# Patient Record
Sex: MALE | Race: WHITE | NOT HISPANIC OR LATINO | Employment: FULL TIME | RURAL
[De-identification: names, ages, dates, MRNs, and addresses within clinical notes are randomized per-mention and may not be internally consistent; named-entity substitution may affect disease eponyms.]

---

## 2019-11-20 ENCOUNTER — TELEPHONE (OUTPATIENT)
Dept: FAMILY MEDICINE CLINIC | Facility: CLINIC | Age: 34
End: 2019-11-20

## 2019-11-20 NOTE — TELEPHONE ENCOUNTER
Patient stopped by office yesterday stating that he gotten up during the night to pee and passed out and has bruised ribs from the fall. He states that he is aware that there is nothing to do for the bruised ribs but was questioning why he passed out. He wanted to know if it was from getting up to quickly.   I have tried three times to call the patient at the number he left ([phone]) to suggest that he schedule an appointment to be seen for evaluation and he does not answer and there is no voicemail set up.

## 2019-11-21 NOTE — TELEPHONE ENCOUNTER
So noted.  If we can page it of communicate to the staff to watch for him and schedule him that is about all we can do.

## 2019-11-25 PROBLEM — F41.9 ANXIETY: Status: ACTIVE | Noted: 2019-02-05

## 2019-11-25 PROBLEM — F32.A DEPRESSION: Status: ACTIVE | Noted: 2019-02-05

## 2019-11-25 PROBLEM — R07.9 CHEST PAIN: Status: ACTIVE | Noted: 2019-02-14

## 2019-11-25 PROBLEM — R55 SYNCOPE: Status: ACTIVE | Noted: 2019-11-25

## 2019-11-25 RX ORDER — BUSPIRONE HYDROCHLORIDE 10 MG/1
TABLET ORAL EVERY 12 HOURS
COMMUNITY
Start: 2019-02-05 | End: 2020-03-24

## 2019-11-26 ENCOUNTER — OFFICE VISIT (OUTPATIENT)
Dept: FAMILY MEDICINE CLINIC | Facility: CLINIC | Age: 34
End: 2019-11-26

## 2019-11-26 VITALS
SYSTOLIC BLOOD PRESSURE: 145 MMHG | TEMPERATURE: 98 F | RESPIRATION RATE: 16 BRPM | BODY MASS INDEX: 29.26 KG/M2 | HEIGHT: 72 IN | OXYGEN SATURATION: 98 % | HEART RATE: 73 BPM | DIASTOLIC BLOOD PRESSURE: 83 MMHG | WEIGHT: 216 LBS

## 2019-11-26 DIAGNOSIS — R53.83 OTHER FATIGUE: ICD-10-CM

## 2019-11-26 DIAGNOSIS — R00.2 PALPITATIONS: Primary | ICD-10-CM

## 2019-11-26 PROCEDURE — 99214 OFFICE O/P EST MOD 30 MIN: CPT | Performed by: NURSE PRACTITIONER

## 2019-11-26 PROCEDURE — 93000 ELECTROCARDIOGRAM COMPLETE: CPT | Performed by: NURSE PRACTITIONER

## 2019-11-26 NOTE — PATIENT INSTRUCTIONS
Discussed differential diagnosis with patient.  Will get Holter monitor as well as TSH.  May consider testosterone level if those are normal.  Also may do cardiac referral.

## 2019-11-26 NOTE — PROGRESS NOTES
Chief Complaint   Patient presents with   • Dizziness     Seen at [facility] ER on 11/24/2019        Subjective   Mykel Fernandez is a 34 y.o. male who presents today for fatigue and palpitations.    HPI: About 3 weeks ago patient stood up from the couch after being asleep for a while and got somewhat dizzy.  He went to the bathroom and and passed out.  He injured his ribs at the time.  He felt okay and felt like it was because he got up quickly.  He had another episode of feeling palpitations and tingling in his left arm.  He was seen at [facility] on the 24th.  He had a CT scan of his head chest x-ray and labs.  Which were all normal.  Patient is here to foot today to follow-up on that ER visit.  He reports that he has had different episodes of fatigue and occasional palpitations a couple of times a month over the past few months.  He does not feel like he has a normal type energy level he usually has.  The only medication he takes is BuSpar he is taking it on a regular basis.  He has no increase in anxiety or depression.  He is here for additional evaluation in regard to this fatigue and palpitations.  Brother has a history of Fritz-Parkinson-White.  Patient has never had any symptoms like this in the past.    Mykel Fernandez  has a past medical history of Anxiety, Chest pain, and Depression.    No Known Allergies    Current Outpatient Medications:   •  busPIRone (BUSPAR) 10 MG tablet, Every 12 (Twelve) Hours., Disp: , Rfl:   Past Medical History:   Diagnosis Date   • Anxiety    • Chest pain    • Depression      History reviewed. No pertinent surgical history.  Social History     Socioeconomic History   • Marital status:      Spouse name: Not on file   • Number of children: Not on file   • Years of education: Not on file   • Highest education level: Not on file   Tobacco Use   • Smoking status: Never Smoker   • Smokeless tobacco: Never Used   Substance and Sexual Activity   • Alcohol use: Yes      "Frequency: Never   • Drug use: No     Family History   Problem Relation Age of Onset   • Hypertension Brother        Family history, surgical history, past medical history, Allergies and med's reviewed with patient today and updated in EP[employer] EMR.   PHQ-2 Depression Screening  Little interest or pleasure in doing things? 0   Feeling down, depressed, or hopeless? 0   PHQ-2 Total Score 0   PHQ-9 Depression Screening  Little interest or pleasure in doing things? 0   Feeling down, depressed, or hopeless? 0   Trouble falling or staying asleep, or sleeping too much?     Feeling tired or having little energy?     Poor appetite or overeating?     Feeling bad about yourself - or that you are a failure or have let yourself or your family down?     Trouble concentrating on things, such as reading the newspaper or watching television?     Moving or speaking so slowly that other people could have noticed? Or the opposite - being so fidgety or restless that you have been moving around a lot more than usual?     Thoughts that you would be better off dead, or of hurting yourself in some way?     PHQ-9 Total Score 0   If you checked off any problems, how difficult have these problems made it for you to do your work, take care of things at home, or get along with other people?       ROS:  Review of Systems   Constitutional: Positive for fatigue. Negative for fever.   Respiratory: Negative for cough and shortness of breath.    Cardiovascular: Positive for palpitations. Negative for chest pain.   Gastrointestinal: Negative for abdominal pain.   Genitourinary: Negative for dysuria.       OBJECTIVE:  Vitals:    11/26/19 1459   BP: 145/83   BP Location: Right arm   Patient Position: Sitting   Cuff Size: Adult   Pulse: 73   Resp: 16   Temp: 98 °F (36.7 °C)   TempSrc: Oral   SpO2: 98%   Weight: 98 kg (216 lb)   Height: 182.9 cm (72\")     Physical Exam   Constitutional: He appears well-developed and well-nourished.   Cardiovascular: Normal " rate, regular rhythm and normal heart sounds. Exam reveals no gallop and no friction rub.   No murmur heard.  Pulmonary/Chest: Effort normal and breath sounds normal. He has no wheezes. He has no rales.   Abdominal: Soft. Bowel sounds are normal. There is no tenderness.   Neurological: He is alert.   Skin: Skin is warm and dry.   Nursing note and vitals reviewed.      ASSESSMENT/ PLAN:    Mykel was seen today for dizziness.    Diagnoses and all orders for this visit:    Palpitations  -     TSH  -     ECG 12 Lead  -     Holter Monitor - 24 Hour; Future    Other fatigue  -     TSH  -     ECG 12 Lead  -     Holter Monitor - 24 Hour; Future        Orders Placed Today:     No orders of the defined types were placed in this encounter.       Management Plan:     An After Visit Summary was printed and given to the patient at discharge.    Follow-up: Return if symptoms worsen or fail to improve.    ANNEL Jacinto 11/26/2019 4:24 PM  This note was electronically signed.

## 2019-11-27 LAB — TSH SERPL DL<=0.005 MIU/L-ACNC: 1.06 UIU/ML (ref 0.45–4.5)

## 2019-12-03 ENCOUNTER — TELEPHONE (OUTPATIENT)
Dept: FAMILY MEDICINE CLINIC | Facility: CLINIC | Age: 34
End: 2019-12-03

## 2019-12-03 DIAGNOSIS — R53.83 OTHER FATIGUE: ICD-10-CM

## 2019-12-03 DIAGNOSIS — R00.2 PALPITATIONS: ICD-10-CM

## 2019-12-03 DIAGNOSIS — R55 SYNCOPE, UNSPECIFIED SYNCOPE TYPE: Primary | ICD-10-CM

## 2019-12-03 NOTE — TELEPHONE ENCOUNTER
Please inform patient that the Holter monitor appeared to be basically normal.  There was occasionally what would be considered normal fast beats.  Due to his palpitations fatigue and syncopal episode, will go ahead and refer him to cardiology.  As discussed previously they may want to do a 30-day monitor.

## 2019-12-27 ENCOUNTER — OFFICE VISIT (OUTPATIENT)
Dept: CARDIOLOGY | Facility: CLINIC | Age: 34
End: 2019-12-27

## 2019-12-27 VITALS
BODY MASS INDEX: 29.53 KG/M2 | HEIGHT: 72 IN | SYSTOLIC BLOOD PRESSURE: 151 MMHG | DIASTOLIC BLOOD PRESSURE: 88 MMHG | HEART RATE: 69 BPM | WEIGHT: 218 LBS

## 2019-12-27 DIAGNOSIS — R00.2 PALPITATIONS: ICD-10-CM

## 2019-12-27 DIAGNOSIS — R55 SYNCOPE, UNSPECIFIED SYNCOPE TYPE: Primary | ICD-10-CM

## 2019-12-27 DIAGNOSIS — R55 NEAR SYNCOPE: ICD-10-CM

## 2019-12-27 DIAGNOSIS — R07.9 CHEST PAIN, UNSPECIFIED TYPE: ICD-10-CM

## 2019-12-27 PROCEDURE — 99204 OFFICE O/P NEW MOD 45 MIN: CPT | Performed by: INTERNAL MEDICINE

## 2019-12-27 NOTE — PROGRESS NOTES
Date of Office Visit: 2019  Encounter Provider: Dr. Salinas Valdivia  Place of Service: [facility] CARDIOLOGY [place]  Patient Name: Mykel Fernandez  :1985  Zohra Bradley APRN    Chief Complaint   Patient presents with   • Palpitations     consult/holter   • Syncope   • Chest Pain     History of Present Illness:    I am pleased to see Mr. Fernandez in my office today as a new consultation.    As you know, patient is 34 years old white gentleman whose past medical history is significant for anxiety disorder who is referred to me for near syncopal episode and palpitation.    In 2019, patient had an episode of syncope.  Patient reports that he was in the bed in the middle of night and woke up for urination.  He walked to the restroom.  He was micturating while standing and he passed out and hit his rib cage to the toilet bowl.  Patient was evaluated by PCP.  Patient underwent Holter monitor which was unremarkable.  Patient reported symptom of palpitation at the time of his syncope.  Patient did complain of mild chest pain.  Patient had similar episode at age 13 many years ago when he passed out while micturition.  Initial work-up at that time was unremarkable as per patient.    Patient denies any symptom of chest pain or tightness or heaviness in between these episode.  Patient denies orthopnea, PND, fever or chills.  No leg edema noted.    EKG done in your office showed normal sinus rhythm with short UT interval.  No delta waves are noted.    Patient does not have previous history of CAD, PCI or MI.  He drinks 3-4 beers 2-3 times in a week.  No recreational drug.  Patient has decrease his caffeine intake after this episode.  Patient has history of WPW syndrome for his younger brother.  Patient brother required ablation.    His EKG does not suggest WPW but UT interval is short.  Likelihood of accessory pathway is there.  At this stage I will recommend to proceed with event monitor,  "echocardiogram and TMT.  If these initial tests are negative I would proceed with loop recorder implantation.  His blood pressure is slightly elevated at this time I would recommend blood pressure monitoring.        Past Medical History:   Diagnosis Date   • Anxiety    • Chest pain    • Depression    • Palpitations          Past Surgical History:   Procedure Laterality Date   • CONVERTED (HISTORICAL) HOLTER  2019           Current Outpatient Medications:   •  busPIRone (BUSPAR) 10 MG tablet, Every 12 (Twelve) Hours., Disp: , Rfl:       Social History     Socioeconomic History   • Marital status:      Spouse name: Not on file   • Number of children: Not on file   • Years of education: Not on file   • Highest education level: Not on file   Tobacco Use   • Smoking status: Never Smoker   • Smokeless tobacco: Never Used   Substance and Sexual Activity   • Alcohol use: Yes     Frequency: Never   • Drug use: No   • Sexual activity: Defer         Review of Systems   Constitution: Negative for chills and fever.   HENT: Negative for ear discharge and nosebleeds.    Eyes: Negative for discharge and redness.   Cardiovascular: Positive for chest pain, palpitations and syncope. Negative for orthopnea and paroxysmal nocturnal dyspnea.   Respiratory: Negative for cough, shortness of breath and wheezing.    Endocrine: Negative for heat intolerance.   Skin: Negative for rash.   Musculoskeletal: Negative for arthritis and myalgias.   Gastrointestinal: Negative for abdominal pain, melena, nausea and vomiting.   Genitourinary: Negative for dysuria and hematuria.   Neurological: Negative for dizziness, light-headedness, numbness and tremors.   Psychiatric/Behavioral: Negative for depression. The patient is not nervous/anxious.        Procedures    Procedures    No orders to display           Objective:    /88   Pulse 69   Ht 182.9 cm (72\")   Wt 98.9 kg (218 lb)   BMI 29.57 kg/m²         Physical Exam   Constitutional: " He is oriented to person, place, and time. He appears well-developed and well-nourished.   HENT:   Head: Normocephalic and atraumatic.   Eyes: No scleral icterus.   Neck: No thyromegaly present.   Cardiovascular: Normal rate, regular rhythm and normal heart sounds. Exam reveals no gallop and no friction rub.   No murmur heard.  Pulmonary/Chest: Effort normal and breath sounds normal. No respiratory distress. He has no wheezes. He has no rales.   Abdominal: There is no tenderness.   Musculoskeletal: He exhibits no edema.   Lymphadenopathy:     He has no cervical adenopathy.   Neurological: He is alert and oriented to person, place, and time.   Skin: No rash noted. No erythema.   Psychiatric: He has a normal mood and affect.           Assessment:       Diagnosis Plan   1. Syncope, unspecified syncope type  Cardiac Event Monitor    Treadmill Stress Test    Adult Transthoracic Echo Complete W/ Cont if Necessary Per Protocol   2. Palpitations  Cardiac Event Monitor    Treadmill Stress Test    Adult Transthoracic Echo Complete W/ Cont if Necessary Per Protocol   3. Chest pain, unspecified type  Cardiac Event Monitor    Treadmill Stress Test    Adult Transthoracic Echo Complete W/ Cont if Necessary Per Protocol   4. Near syncope  Cardiac Event Monitor    Treadmill Stress Test    Adult Transthoracic Echo Complete W/ Cont if Necessary Per Protocol            Plan:       I would recommend to proceed with event monitor and TMT.  Echocardiogram would be done.  I will see the patient in 4 weeks

## 2020-01-10 ENCOUNTER — HOSPITAL ENCOUNTER (OUTPATIENT)
Dept: RESPIRATORY THERAPY | Facility: HOSPITAL | Age: 35
Discharge: HOME | End: 2020-01-10

## 2020-01-10 ENCOUNTER — HOSPITAL ENCOUNTER (OUTPATIENT)
Dept: CARDIOLOGY | Facility: HOSPITAL | Age: 35
Discharge: HOME | End: 2020-01-10
Admitting: INTERNAL MEDICINE

## 2020-01-10 VITALS
HEIGHT: 72 IN | SYSTOLIC BLOOD PRESSURE: 128 MMHG | DIASTOLIC BLOOD PRESSURE: 90 MMHG | BODY MASS INDEX: 29.53 KG/M2 | WEIGHT: 218 LBS

## 2020-01-10 DIAGNOSIS — R07.9 CHEST PAIN, UNSPECIFIED TYPE: ICD-10-CM

## 2020-01-10 DIAGNOSIS — R55 SYNCOPE, UNSPECIFIED SYNCOPE TYPE: ICD-10-CM

## 2020-01-10 DIAGNOSIS — R55 NEAR SYNCOPE: ICD-10-CM

## 2020-01-10 DIAGNOSIS — R00.2 PALPITATIONS: ICD-10-CM

## 2020-01-10 PROCEDURE — 93270 REMOTE 30 DAY ECG REV/REPORT: CPT

## 2020-01-10 PROCEDURE — 93306 TTE W/DOPPLER COMPLETE: CPT

## 2020-01-12 LAB
BH CV ECHO MEAS - % IVS THICK: 48.6 %
BH CV ECHO MEAS - % LVPW THICK: 46.5 %
BH CV ECHO MEAS - ACS: 2 CM
BH CV ECHO MEAS - AO MAX PG (FULL): 1.2 MMHG
BH CV ECHO MEAS - AO MAX PG: 6.4 MMHG
BH CV ECHO MEAS - AO MEAN PG (FULL): 0.8 MMHG
BH CV ECHO MEAS - AO MEAN PG: 3.5 MMHG
BH CV ECHO MEAS - AO ROOT AREA (BSA CORRECTED): 1.3
BH CV ECHO MEAS - AO ROOT AREA: 6.5 CM^2
BH CV ECHO MEAS - AO ROOT DIAM: 2.9 CM
BH CV ECHO MEAS - AO V2 MAX: 126.1 CM/SEC
BH CV ECHO MEAS - AO V2 MEAN: 88.6 CM/SEC
BH CV ECHO MEAS - AO V2 VTI: 27.6 CM
BH CV ECHO MEAS - AVA(I,A): 3.1 CM^2
BH CV ECHO MEAS - AVA(I,D): 3.1 CM^2
BH CV ECHO MEAS - AVA(V,A): 3.1 CM^2
BH CV ECHO MEAS - AVA(V,D): 3.1 CM^2
BH CV ECHO MEAS - BSA(HAYCOCK): 2.3 M^2
BH CV ECHO MEAS - BSA: 2.2 M^2
BH CV ECHO MEAS - BZI_BMI: 29.6 KILOGRAMS/M^2
BH CV ECHO MEAS - BZI_METRIC_HEIGHT: 182.9 CM
BH CV ECHO MEAS - BZI_METRIC_WEIGHT: 98.9 KG
BH CV ECHO MEAS - EDV(CUBED): 159.5 ML
BH CV ECHO MEAS - EDV(MOD-SP2): 91.9 ML
BH CV ECHO MEAS - EDV(MOD-SP4): 76.8 ML
BH CV ECHO MEAS - EDV(TEICH): 142.7 ML
BH CV ECHO MEAS - EF(CUBED): 80.2 %
BH CV ECHO MEAS - EF(MOD-BP): 56 %
BH CV ECHO MEAS - EF(MOD-SP2): 66.3 %
BH CV ECHO MEAS - EF(MOD-SP4): 36.3 %
BH CV ECHO MEAS - EF(TEICH): 72.1 %
BH CV ECHO MEAS - ESV(CUBED): 31.6 ML
BH CV ECHO MEAS - ESV(MOD-SP2): 31 ML
BH CV ECHO MEAS - ESV(MOD-SP4): 48.9 ML
BH CV ECHO MEAS - ESV(TEICH): 39.8 ML
BH CV ECHO MEAS - FS: 41.7 %
BH CV ECHO MEAS - IVS/LVPW: 0.91
BH CV ECHO MEAS - IVSD: 0.97 CM
BH CV ECHO MEAS - IVSS: 1.4 CM
BH CV ECHO MEAS - LA DIMENSION(2D): 3.3 CM
BH CV ECHO MEAS - LV DIASTOLIC VOL/BSA (35-75): 34.7 ML/M^2
BH CV ECHO MEAS - LV MASS(C)D: 213 GRAMS
BH CV ECHO MEAS - LV MASS(C)DI: 96.4 GRAMS/M^2
BH CV ECHO MEAS - LV MASS(C)S: 168.9 GRAMS
BH CV ECHO MEAS - LV MASS(C)SI: 76.4 GRAMS/M^2
BH CV ECHO MEAS - LV MAX PG: 5.2 MMHG
BH CV ECHO MEAS - LV MEAN PG: 2.7 MMHG
BH CV ECHO MEAS - LV SYSTOLIC VOL/BSA (12-30): 22.1 ML/M^2
BH CV ECHO MEAS - LV V1 MAX: 113.7 CM/SEC
BH CV ECHO MEAS - LV V1 MEAN: 76.7 CM/SEC
BH CV ECHO MEAS - LV V1 VTI: 24.4 CM
BH CV ECHO MEAS - LVIDD: 5.4 CM
BH CV ECHO MEAS - LVIDS: 3.2 CM
BH CV ECHO MEAS - LVOT AREA: 3.4 CM^2
BH CV ECHO MEAS - LVOT DIAM: 2.1 CM
BH CV ECHO MEAS - LVPWD: 1.1 CM
BH CV ECHO MEAS - LVPWS: 1.6 CM
BH CV ECHO MEAS - MV A MAX VEL: 60.5 CM/SEC
BH CV ECHO MEAS - MV DEC SLOPE: 478.9 CM/SEC^2
BH CV ECHO MEAS - MV DEC TIME: 0.19 SEC
BH CV ECHO MEAS - MV E MAX VEL: 89.6 CM/SEC
BH CV ECHO MEAS - MV E/A: 1.5
BH CV ECHO MEAS - MV MAX PG: 3.9 MMHG
BH CV ECHO MEAS - MV MEAN PG: 0.96 MMHG
BH CV ECHO MEAS - MV V2 MAX: 98.6 CM/SEC
BH CV ECHO MEAS - MV V2 MEAN: 42.6 CM/SEC
BH CV ECHO MEAS - MV V2 VTI: 31.5 CM
BH CV ECHO MEAS - MVA(VTI): 2.7 CM^2
BH CV ECHO MEAS - PA ACC TIME: 0.11 SEC
BH CV ECHO MEAS - PA MAX PG (FULL): 0.68 MMHG
BH CV ECHO MEAS - PA MAX PG: 2.4 MMHG
BH CV ECHO MEAS - PA MEAN PG (FULL): 0.58 MMHG
BH CV ECHO MEAS - PA MEAN PG: 1.6 MMHG
BH CV ECHO MEAS - PA PR(ACCEL): 29.2 MMHG
BH CV ECHO MEAS - PA V2 MAX: 77.9 CM/SEC
BH CV ECHO MEAS - PA V2 MEAN: 60.6 CM/SEC
BH CV ECHO MEAS - PA V2 VTI: 16.8 CM
BH CV ECHO MEAS - PULM A REVS DUR: 0.07 SEC
BH CV ECHO MEAS - PULM A REVS VEL: 27.6 CM/SEC
BH CV ECHO MEAS - PULM DIAS VEL: 34.9 CM/SEC
BH CV ECHO MEAS - PULM S/D: 1.2
BH CV ECHO MEAS - PULM SYS VEL: 42.7 CM/SEC
BH CV ECHO MEAS - RAP SYSTOLE: 3 MMHG
BH CV ECHO MEAS - RV MAX PG: 1.8 MMHG
BH CV ECHO MEAS - RV MEAN PG: 0.98 MMHG
BH CV ECHO MEAS - RV V1 MAX: 66.2 CM/SEC
BH CV ECHO MEAS - RV V1 MEAN: 46.2 CM/SEC
BH CV ECHO MEAS - RV V1 VTI: 14.9 CM
BH CV ECHO MEAS - RVDD: 2.7 CM
BH CV ECHO MEAS - RVSP: 18.9 MMHG
BH CV ECHO MEAS - SI(AO): 81 ML/M^2
BH CV ECHO MEAS - SI(CUBED): 57.9 ML/M^2
BH CV ECHO MEAS - SI(LVOT): 38.1 ML/M^2
BH CV ECHO MEAS - SI(MOD-SP2): 27.6 ML/M^2
BH CV ECHO MEAS - SI(MOD-SP4): 12.6 ML/M^2
BH CV ECHO MEAS - SI(TEICH): 46.6 ML/M^2
BH CV ECHO MEAS - SV(AO): 179.1 ML
BH CV ECHO MEAS - SV(CUBED): 128 ML
BH CV ECHO MEAS - SV(LVOT): 84.2 ML
BH CV ECHO MEAS - SV(MOD-SP2): 60.9 ML
BH CV ECHO MEAS - SV(MOD-SP4): 27.9 ML
BH CV ECHO MEAS - SV(TEICH): 103 ML
BH CV ECHO MEAS - TR MAX VEL: 176.1 CM/SEC

## 2020-01-12 PROCEDURE — 93306 TTE W/DOPPLER COMPLETE: CPT | Performed by: INTERNAL MEDICINE

## 2020-01-14 ENCOUNTER — TELEPHONE (OUTPATIENT)
Dept: CARDIOLOGY | Facility: CLINIC | Age: 35
End: 2020-01-14

## 2020-02-07 ENCOUNTER — OFFICE VISIT (OUTPATIENT)
Dept: CARDIOLOGY | Facility: CLINIC | Age: 35
End: 2020-02-07

## 2020-02-07 VITALS
HEIGHT: 72 IN | DIASTOLIC BLOOD PRESSURE: 78 MMHG | BODY MASS INDEX: 28.44 KG/M2 | HEART RATE: 85 BPM | WEIGHT: 210 LBS | SYSTOLIC BLOOD PRESSURE: 119 MMHG

## 2020-02-07 DIAGNOSIS — R00.2 PALPITATIONS: Primary | ICD-10-CM

## 2020-02-07 DIAGNOSIS — R55 NEAR SYNCOPE: ICD-10-CM

## 2020-02-07 DIAGNOSIS — R07.9 CHEST PAIN, UNSPECIFIED TYPE: ICD-10-CM

## 2020-02-07 PROCEDURE — 99213 OFFICE O/P EST LOW 20 MIN: CPT | Performed by: INTERNAL MEDICINE

## 2020-02-07 NOTE — PROGRESS NOTES
Date of Office Visit: 2020  Encounter Provider: Dr. Salinas Valdivia  Place of Service: [facility] CARDIOLOGY [place]  Patient Name: Mykel Fernandez  :1985  Zohra Bradley APRN    Chief Complaint   Patient presents with   • Palpitations     6 week follow up echo/stress test/holter   • Chest Pain   • Syncope     History of Present Illness    I am pleased to see Mr. Fernandez in my office today as a follow-up    As you know, patient is 34 years old white gentleman whose past medical history is significant for anxiety disorder who came today for follow-up.    In 2019, patient was presented to me for near syncopal episode.  Patient had syncope at the time of micturition.  Patient had similar episode about 12 years ago.  Patient underwent Holter monitor which was unremarkable.  Patient underwent TMT and it was negative for ischemia or arrhythmia.  Echocardiogram showed preserved left ventricle function.  Event monitor is in progress but so far there is no significant arrhythmia was noted.    Since the previous visit patient has done well.  Patient has not had any further episode of dizziness or lightheadedness or syncope or presyncope.  No chest pain or tightness or heaviness no orthopnea or PND.    At this stage, I would recommend that patient should proceed with observation.  I will see the patient as needed.  Patient is advised and given precaution against fall.  Patient is advised to call my office if there is recurrence of symptoms.  I suspect patient has micturition syncope.    Past Medical History:   Diagnosis Date   • Anxiety    • Chest pain    • Depression    • Palpitations          Past Surgical History:   Procedure Laterality Date   • CARDIOVASCULAR STRESS TEST  2019   • CONVERTED (HISTORICAL) HOLTER  2019   • ECHO - CONVERTED  2019           Current Outpatient Medications:   •  busPIRone (BUSPAR) 10 MG tablet, Every 12 (Twelve) Hours., Disp: , Rfl:       Social History  "    Socioeconomic History   • Marital status:      Spouse name: Not on file   • Number of children: Not on file   • Years of education: Not on file   • Highest education level: Not on file   Tobacco Use   • Smoking status: Never Smoker   • Smokeless tobacco: Never Used   Substance and Sexual Activity   • Alcohol use: Yes     Frequency: Never   • Drug use: No   • Sexual activity: Defer         Review of Systems   Constitution: Negative for chills and fever.   HENT: Negative for ear discharge and nosebleeds.    Eyes: Negative for discharge and redness.   Cardiovascular: Negative for chest pain, orthopnea, palpitations, paroxysmal nocturnal dyspnea and syncope.   Respiratory: Negative for cough, shortness of breath and wheezing.    Endocrine: Negative for heat intolerance.   Skin: Negative for rash.   Musculoskeletal: Negative for arthritis and myalgias.   Gastrointestinal: Negative for abdominal pain, melena, nausea and vomiting.   Genitourinary: Negative for dysuria and hematuria.   Neurological: Negative for dizziness, light-headedness, numbness and tremors.   Psychiatric/Behavioral: Negative for depression. The patient is not nervous/anxious.        Procedures    Procedures    No orders to display           Objective:    /78   Pulse 85   Ht 182.9 cm (72.01\")   Wt 95.3 kg (210 lb)   BMI 28.47 kg/m²         Physical Exam   Constitutional: He is oriented to person, place, and time. He appears well-developed and well-nourished.   HENT:   Head: Normocephalic and atraumatic.   Eyes: Right eye exhibits no discharge. No scleral icterus.   Neck: No thyromegaly present.   Cardiovascular: Normal rate, regular rhythm and normal heart sounds. Exam reveals no gallop and no friction rub.   No murmur heard.  Pulmonary/Chest: Effort normal and breath sounds normal. No respiratory distress. He has no wheezes. He has no rales.   Abdominal: There is no tenderness.   Musculoskeletal: He exhibits no edema. "   Lymphadenopathy:     He has no cervical adenopathy.   Neurological: He is alert and oriented to person, place, and time.   Skin: No rash noted. No erythema.   Psychiatric: He has a normal mood and affect.           Assessment:       Diagnosis Plan   1. Palpitations     2. Near syncope     3. Chest pain, unspecified type              Plan:       Patient is advised to monitor his symptoms and call me if there is recurrence.  I suspect patient has micturition syncope.  Precaution against vasovagal episodes are discussed.  I will see the patient as needed

## 2020-02-15 LAB
Lab: 2
TOAL ENROLLMENT DAYS: 30

## 2020-02-15 PROCEDURE — 93228 REMOTE 30 DAY ECG REV/REPORT: CPT | Performed by: INTERNAL MEDICINE

## 2020-02-17 ENCOUNTER — TELEPHONE (OUTPATIENT)
Dept: CARDIOLOGY | Facility: CLINIC | Age: 35
End: 2020-02-17

## 2020-03-24 RX ORDER — BUSPIRONE HYDROCHLORIDE 10 MG/1
TABLET ORAL
Qty: 180 TABLET | Refills: 3 | Status: SHIPPED | OUTPATIENT
Start: 2020-03-24